# Patient Record
Sex: FEMALE | Race: WHITE | NOT HISPANIC OR LATINO | URBAN - METROPOLITAN AREA
[De-identification: names, ages, dates, MRNs, and addresses within clinical notes are randomized per-mention and may not be internally consistent; named-entity substitution may affect disease eponyms.]

---

## 2021-08-19 ENCOUNTER — TRANSFERRED RECORDS (OUTPATIENT)
Dept: HEALTH INFORMATION MANAGEMENT | Facility: CLINIC | Age: 66
End: 2021-08-19

## 2021-08-20 ENCOUNTER — TRANSCRIBE ORDERS (OUTPATIENT)
Dept: OTHER | Age: 66
End: 2021-08-20

## 2021-08-20 DIAGNOSIS — N83.8 OVARIAN MASS: Primary | ICD-10-CM

## 2021-08-25 ENCOUNTER — DOCUMENTATION ONLY (OUTPATIENT)
Dept: ONCOLOGY | Facility: CLINIC | Age: 66
End: 2021-08-25

## 2021-08-25 NOTE — PROGRESS NOTES
Action    Action Taken 8/25/2021 2pm KEB   I called pt Angelita -both of her phone numbers went to . I left a vm on her home phone.     9/1/2021 9:08AM KECESAR   I called pt Angelita again - she needs to sign an GRECIA form for [facility]. Pt Angelita doesn't have a printer at home. She can't go anywhere today and could go tomorrow.     I called [facility] medical records dept [phone] #5- Pt Angelita has only 2 visits with [facility]. They are going to fax over records.     I called [facility]'s imaging dept [phone] - they will send imaging over soon.     I called pt Angelita to let her know she doesn't need to sign an GRECAI-she didn't answer either phone.. I'll call her again later.     9/1/2021 12:11PM GEO   I faxed records from [facility] to [facility].     9/2/2021 1:29pm GEO   I called the [facility] in [place] to get recs on adnexal mass.   They looked up the pt.. she is in their system but never showed up to her appt.     RN In-basket info:   She had a CT scan there as well with recommendations for an MRI. patient and she was not able to tell me why she was referred or if she had any scans or anything.     9/10/2021 8:04AM GEO   I called [facility] in [place]  Phone: [phone]- they said the images might have been ordered or done at [facility] in [place] Phone: [phone]- I was transferred to [facility] and they don't have anything on the pt.     I called [facility]- they do have a CT on pt Angelita!     Ph: [phone]   Fax: [phone]  Mailing Address:   [address] [place] [address]    I called pt Angelita again - her phone went to .

## 2021-08-26 ENCOUNTER — TRANSFERRED RECORDS (OUTPATIENT)
Dept: HEALTH INFORMATION MANAGEMENT | Facility: CLINIC | Age: 66
End: 2021-08-26